# Patient Record
Sex: FEMALE | Race: WHITE
[De-identification: names, ages, dates, MRNs, and addresses within clinical notes are randomized per-mention and may not be internally consistent; named-entity substitution may affect disease eponyms.]

---

## 2019-12-06 ENCOUNTER — HOSPITAL ENCOUNTER (EMERGENCY)
Dept: HOSPITAL 25 - UCEAST | Age: 64
Discharge: HOME | End: 2019-12-06
Payer: COMMERCIAL

## 2019-12-06 VITALS — SYSTOLIC BLOOD PRESSURE: 96 MMHG | DIASTOLIC BLOOD PRESSURE: 61 MMHG

## 2019-12-06 DIAGNOSIS — J32.9: ICD-10-CM

## 2019-12-06 DIAGNOSIS — J45.909: Primary | ICD-10-CM

## 2019-12-06 PROCEDURE — 99212 OFFICE O/P EST SF 10 MIN: CPT

## 2019-12-06 PROCEDURE — 71046 X-RAY EXAM CHEST 2 VIEWS: CPT

## 2019-12-06 PROCEDURE — G0463 HOSPITAL OUTPT CLINIC VISIT: HCPCS

## 2019-12-06 NOTE — ED
Respiratory





- HPI Summary


HPI Summary: 


64 year old female presents with cough for the past 3 weeks.  She states that 

it started with a cough and seemed to get better and then she got sinus 

congestion.  She states then it became a cough again.  Has had sinus congestion 

for at least past 10 days.  She willy been having worsening sinus pressure.  She 

states the cough is productive.  Has a history of asthma.  Is nonsmoker.  She 

admits to occasionally ear pain.  Also admits to headache. 





- History of Current Complaint


Chief Complaint: UCGeneralIllness


Stated Complaint: COUGH


Time Seen by Provider: 12/06/19 11:07


Pain Intensity: 1





- Allergy/Home Medications


Allergies/Adverse Reactions: 


 Allergies











Allergy/AdvReac Type Severity Reaction Status Date / Time


 


No Known Allergies Allergy   Verified 12/06/19 10:54











Home Medications: 


 Home Medications





Cholecalciferol (Vitamin D3) [Vitamin D-3] 2,000 unit PO DAILY 12/06/19 [

History Confirmed 12/06/19]


Multivit-Min/Iron/Folic Acid/K [Multi For Her Softgel] 1 each PO DAILY 12/06/19 

[History Confirmed 12/06/19]











PMH/Surg Hx/FS Hx/Imm Hx


Endocrine/Hematology History: 


   Denies: Hx Anticoagulant Therapy


Respiratory History: 


   Denies: Hx Asthma


Infectious Disease History: No


Infectious Disease History: 


   Denies: Traveled Outside the [place] in Last 30 Days





- Family History


Known Family History: Positive: Non-Contributory





- Social History


Alcohol Use: Rare


Substance Use Type: Reports: None


Smoking Status (MU): Never Smoked Tobacco





Review of Systems


Negative: Fever


Positive: Nasal Discharge


Negative: Chest Pain


Positive: Cough.  Negative: Shortness Of Breath


All Other Systems Reviewed And Are Negative: Yes





Physical Exam


Triage Information Reviewed: Yes


Vital Signs On Initial Exam: 


 Initial Vitals











Temp Pulse Resp BP Pulse Ox


 


 99.3 F   81   14   96/61   98 


 


 12/06/19 10:55  12/06/19 10:55  12/06/19 10:55  12/06/19 10:55  12/06/19 10:55











Vital Signs Reviewed: Yes


Appearance: Positive: Well-Appearing


Skin: Positive: Warm, Dry


Head/Face: Positive: Normal Head/Face Inspection


Eyes: Positive: Normal, EOMI, JENNIFER, Conjunctiva Clear


ENT: Positive: Pharynx normal, TMs normal, Sinus tenderness


Neck: Positive: Supple, Nontender, No Lymphadenopathy


Respiratory/Lung Sounds: Positive: Clear to Auscultation, Breath Sounds Present


Cardiovascular: Positive: Normal, RRR


Abdomen Description: Positive: Nontender, Soft


Bowel Sounds: Positive: Present


Musculoskeletal: Positive: Normal


Neurological: Positive: Normal


Psychiatric: Positive: Normal





Diagnostics





- Vital Signs


 Vital Signs











  Temp Pulse Resp BP Pulse Ox


 


 12/06/19 10:55  99.3 F  81  14  96/61  98














- Laboratory


Lab Statement: Any lab studies that have been ordered have been reviewed, and 

results considered in the medical decision making process.





- Radiology


  ** chest


Radiology Interpretation Completed By: Radiologist


Summary of Radiographic Findings: #. Stigmata of probable obstructive lung 

disease. No acute pulmonary or cardiac process evident.





Disposition





- Course


Course Of Treatment: 64 year old female presents with cough for the past 3 

weeks.  She states that it started with a cough and seemed to get better and 

then she got sinus congestion.  She states then it became a cough again.  Has 

had sinus congestion for at least past 10 days.  She willy been having worsening 

sinus pressure.  She states the cough is productive.  Has a history of asthma.  

Is nonsmoker.  She admits to occasionally ear pain.  Also admits to headache.  

On exam has sinus tenderness.  Lungs congestion noted.  Chest x-ray shows no 

pneumonia. will treat for sinus infection with augmentin. gave tessalon for 

cough. patient understand and agrees with plan.





- Differential Dx - Cardiopulmonary


Differential Diagnoses - Cardiopulmonary: Bronchitis, Lower Resp Infection, 

Sinusitis





- Diagnoses


Provider Diagnoses: 


 Bronchitis, Sinusitis








Discharge ED





- Sign-Out/Discharge


Documenting (check all that apply): Patient Departure


All imaging exams completed and their final reports reviewed: Yes





- Discharge Plan


Condition: Good


Disposition: HOME


Prescriptions: 


Amoxicillin/Clavulanate TAB* [Augmentin *] 875 mg PO BID #14 tab


Benzonatate CAP* [Tessalon 100 MG CAP*] 100 mg PO TID #21 cap


Patient Education Materials:  Acute Bronchitis (ED)


Referrals: 


Kavitha Muhammad MD [Primary Care Provider] - 


Additional Instructions: 


Take antibiotic twice a day for 7 days


take tessalon three times a day for cough


Use saline spray in nose as much as needed


Follow up with primary care physician within a week


Return to  with any new or worsening symptoms





- Billing Disposition and Condition


Condition: GOOD


Disposition: Home